# Patient Record
Sex: MALE | Race: WHITE | Employment: OTHER | URBAN - METROPOLITAN AREA
[De-identification: names, ages, dates, MRNs, and addresses within clinical notes are randomized per-mention and may not be internally consistent; named-entity substitution may affect disease eponyms.]

---

## 2017-08-12 PROCEDURE — 84153 ASSAY OF PSA TOTAL: CPT | Performed by: INTERNAL MEDICINE

## 2017-08-31 PROCEDURE — 88305 TISSUE EXAM BY PATHOLOGIST: CPT | Performed by: INTERNAL MEDICINE

## 2018-07-24 PROCEDURE — 84153 ASSAY OF PSA TOTAL: CPT | Performed by: INTERNAL MEDICINE

## 2020-03-11 PROBLEM — M47.812 CERVICAL SPONDYLOSIS: Status: ACTIVE | Noted: 2020-03-11

## 2020-03-11 PROBLEM — M54.12 CERVICAL RADICULOPATHY: Status: ACTIVE | Noted: 2020-03-11

## 2020-03-11 PROBLEM — M48.02 FORAMINAL STENOSIS OF CERVICAL REGION: Status: ACTIVE | Noted: 2020-03-11

## 2020-07-20 ENCOUNTER — ORDER TRANSCRIPTION (OUTPATIENT)
Dept: ADMINISTRATIVE | Facility: HOSPITAL | Age: 70
End: 2020-07-20

## 2020-07-20 DIAGNOSIS — Z01.818 PRE-OPERATIVE CLEARANCE: Primary | ICD-10-CM

## 2020-07-21 ENCOUNTER — LAB ENCOUNTER (OUTPATIENT)
Dept: LAB | Facility: HOSPITAL | Age: 70
End: 2020-07-21
Attending: ORTHOPAEDIC SURGERY
Payer: MEDICARE

## 2020-07-21 DIAGNOSIS — Z01.818 PRE-OPERATIVE CLEARANCE: ICD-10-CM

## 2020-07-22 LAB — SARS-COV-2 RNA RESP QL NAA+PROBE: NOT DETECTED

## 2020-07-23 ENCOUNTER — LAB ENCOUNTER (OUTPATIENT)
Dept: LAB | Age: 70
DRG: 473 | End: 2020-07-23
Attending: ORTHOPAEDIC SURGERY
Payer: MEDICARE

## 2020-07-23 DIAGNOSIS — Z01.818 PREOP TESTING: ICD-10-CM

## 2020-07-23 LAB
ANTIBODY SCREEN: NEGATIVE
RH BLOOD TYPE: POSITIVE

## 2020-07-23 PROCEDURE — 86850 RBC ANTIBODY SCREEN: CPT

## 2020-07-23 PROCEDURE — 86901 BLOOD TYPING SEROLOGIC RH(D): CPT

## 2020-07-23 PROCEDURE — 86900 BLOOD TYPING SEROLOGIC ABO: CPT

## 2020-07-23 PROCEDURE — 36415 COLL VENOUS BLD VENIPUNCTURE: CPT

## 2020-07-23 RX ORDER — VANCOMYCIN HYDROCHLORIDE
15 ONCE
Status: CANCELLED | OUTPATIENT
Start: 2020-07-24

## 2020-07-24 ENCOUNTER — APPOINTMENT (OUTPATIENT)
Dept: GENERAL RADIOLOGY | Facility: HOSPITAL | Age: 70
DRG: 473 | End: 2020-07-24
Attending: ORTHOPAEDIC SURGERY
Payer: MEDICARE

## 2020-07-24 ENCOUNTER — HOSPITAL ENCOUNTER (INPATIENT)
Facility: HOSPITAL | Age: 70
LOS: 1 days | Discharge: HOME | DRG: 473 | End: 2020-07-24
Attending: ORTHOPAEDIC SURGERY | Admitting: ORTHOPAEDIC SURGERY
Payer: MEDICARE

## 2020-07-24 ENCOUNTER — ANESTHESIA (OUTPATIENT)
Dept: SURGERY | Facility: HOSPITAL | Age: 70
DRG: 473 | End: 2020-07-24
Payer: MEDICARE

## 2020-07-24 ENCOUNTER — ANESTHESIA EVENT (OUTPATIENT)
Dept: SURGERY | Facility: HOSPITAL | Age: 70
DRG: 473 | End: 2020-07-24
Payer: MEDICARE

## 2020-07-24 VITALS
HEIGHT: 70 IN | RESPIRATION RATE: 16 BRPM | OXYGEN SATURATION: 94 % | DIASTOLIC BLOOD PRESSURE: 79 MMHG | TEMPERATURE: 98 F | BODY MASS INDEX: 34.79 KG/M2 | SYSTOLIC BLOOD PRESSURE: 155 MMHG | WEIGHT: 243 LBS | HEART RATE: 58 BPM

## 2020-07-24 DIAGNOSIS — M54.12 CERVICAL RADICULOPATHY: ICD-10-CM

## 2020-07-24 DIAGNOSIS — Z01.818 PREOP TESTING: Primary | ICD-10-CM

## 2020-07-24 PROCEDURE — BR141ZZ FLUOROSCOPY OF CERVICAL FACET JOINT(S) USING LOW OSMOLAR CONTRAST: ICD-10-PCS | Performed by: ORTHOPAEDIC SURGERY

## 2020-07-24 PROCEDURE — 95860 NEEDLE EMG 1 EXTREMITY: CPT | Performed by: ORTHOPAEDIC SURGERY

## 2020-07-24 PROCEDURE — 0RT30ZZ RESECTION OF CERVICAL VERTEBRAL DISC, OPEN APPROACH: ICD-10-PCS | Performed by: ORTHOPAEDIC SURGERY

## 2020-07-24 PROCEDURE — 0RG20A0 FUSION OF 2 OR MORE CERVICAL VERTEBRAL JOINTS WITH INTERBODY FUSION DEVICE, ANTERIOR APPROACH, ANTERIOR COLUMN, OPEN APPROACH: ICD-10-PCS | Performed by: ORTHOPAEDIC SURGERY

## 2020-07-24 PROCEDURE — 76000 FLUOROSCOPY <1 HR PHYS/QHP: CPT | Performed by: ORTHOPAEDIC SURGERY

## 2020-07-24 PROCEDURE — 95939 C MOTOR EVOKED UPR&LWR LIMBS: CPT | Performed by: ORTHOPAEDIC SURGERY

## 2020-07-24 PROCEDURE — 95938 SOMATOSENSORY TESTING: CPT | Performed by: ORTHOPAEDIC SURGERY

## 2020-07-24 DEVICE — CAGE SPINAL CASCADIA 7D 13X16: Type: IMPLANTABLE DEVICE | Site: NECK | Status: FUNCTIONAL

## 2020-07-24 DEVICE — I-FACTOR PUTTY, 1.0CC SYRINGE
Type: IMPLANTABLE DEVICE | Site: NECK | Status: FUNCTIONAL
Brand: I-FACTOR PEPTIDE ENHANCED BONE GRAFT

## 2020-07-24 RX ORDER — SODIUM CHLORIDE, SODIUM LACTATE, POTASSIUM CHLORIDE, CALCIUM CHLORIDE 600; 310; 30; 20 MG/100ML; MG/100ML; MG/100ML; MG/100ML
INJECTION, SOLUTION INTRAVENOUS CONTINUOUS
Status: DISCONTINUED | OUTPATIENT
Start: 2020-07-24 | End: 2020-07-24

## 2020-07-24 RX ORDER — OXYCODONE HYDROCHLORIDE 5 MG/1
10 TABLET ORAL ONCE
Status: COMPLETED | OUTPATIENT
Start: 2020-07-24 | End: 2020-07-24

## 2020-07-24 RX ORDER — HYDROCODONE BITARTRATE AND ACETAMINOPHEN 5; 325 MG/1; MG/1
2 TABLET ORAL AS NEEDED
Status: DISCONTINUED | OUTPATIENT
Start: 2020-07-24 | End: 2020-07-24

## 2020-07-24 RX ORDER — DEXAMETHASONE SODIUM PHOSPHATE 4 MG/ML
VIAL (ML) INJECTION AS NEEDED
Status: DISCONTINUED | OUTPATIENT
Start: 2020-07-24 | End: 2020-07-24 | Stop reason: SURG

## 2020-07-24 RX ORDER — MORPHINE SULFATE 4 MG/ML
4 INJECTION, SOLUTION INTRAMUSCULAR; INTRAVENOUS EVERY 10 MIN PRN
Status: DISCONTINUED | OUTPATIENT
Start: 2020-07-24 | End: 2020-07-24

## 2020-07-24 RX ORDER — OXYCODONE HYDROCHLORIDE AND ACETAMINOPHEN 5; 325 MG/1; MG/1
1 TABLET ORAL EVERY 4 HOURS PRN
Status: DISCONTINUED | OUTPATIENT
Start: 2020-07-24 | End: 2020-07-24

## 2020-07-24 RX ORDER — ONDANSETRON 2 MG/ML
INJECTION INTRAMUSCULAR; INTRAVENOUS AS NEEDED
Status: DISCONTINUED | OUTPATIENT
Start: 2020-07-24 | End: 2020-07-24 | Stop reason: SURG

## 2020-07-24 RX ORDER — MIDAZOLAM HYDROCHLORIDE 1 MG/ML
INJECTION INTRAMUSCULAR; INTRAVENOUS AS NEEDED
Status: DISCONTINUED | OUTPATIENT
Start: 2020-07-24 | End: 2020-07-24 | Stop reason: SURG

## 2020-07-24 RX ORDER — ONDANSETRON 2 MG/ML
4 INJECTION INTRAMUSCULAR; INTRAVENOUS ONCE AS NEEDED
Status: DISCONTINUED | OUTPATIENT
Start: 2020-07-24 | End: 2020-07-24

## 2020-07-24 RX ORDER — ACETAMINOPHEN 500 MG
1000 TABLET ORAL ONCE
Status: DISCONTINUED | OUTPATIENT
Start: 2020-07-24 | End: 2020-07-24 | Stop reason: HOSPADM

## 2020-07-24 RX ORDER — OXYCODONE HYDROCHLORIDE AND ACETAMINOPHEN 5; 325 MG/1; MG/1
TABLET ORAL
Qty: 60 TABLET | Refills: 0 | Status: SHIPPED | OUTPATIENT
Start: 2020-07-24 | End: 2020-07-24

## 2020-07-24 RX ORDER — OXYCODONE HYDROCHLORIDE AND ACETAMINOPHEN 5; 325 MG/1; MG/1
TABLET ORAL
Qty: 60 TABLET | Refills: 0 | Status: SHIPPED | OUTPATIENT
Start: 2020-07-24 | End: 2020-08-07

## 2020-07-24 RX ORDER — MORPHINE SULFATE 2 MG/ML
2 INJECTION, SOLUTION INTRAMUSCULAR; INTRAVENOUS EVERY 10 MIN PRN
Status: DISCONTINUED | OUTPATIENT
Start: 2020-07-24 | End: 2020-07-24

## 2020-07-24 RX ORDER — HYDROMORPHONE HYDROCHLORIDE 1 MG/ML
0.6 INJECTION, SOLUTION INTRAMUSCULAR; INTRAVENOUS; SUBCUTANEOUS EVERY 5 MIN PRN
Status: DISCONTINUED | OUTPATIENT
Start: 2020-07-24 | End: 2020-07-24

## 2020-07-24 RX ORDER — PROCHLORPERAZINE EDISYLATE 5 MG/ML
5 INJECTION INTRAMUSCULAR; INTRAVENOUS ONCE AS NEEDED
Status: DISCONTINUED | OUTPATIENT
Start: 2020-07-24 | End: 2020-07-24

## 2020-07-24 RX ORDER — VANCOMYCIN HYDROCHLORIDE 500 MG/10ML
INJECTION, POWDER, LYOPHILIZED, FOR SOLUTION INTRAVENOUS CONTINUOUS PRN
Status: COMPLETED | OUTPATIENT
Start: 2020-07-24 | End: 2020-07-24

## 2020-07-24 RX ORDER — FAMOTIDINE 20 MG/1
20 TABLET ORAL ONCE
Status: COMPLETED | OUTPATIENT
Start: 2020-07-24 | End: 2020-07-24

## 2020-07-24 RX ORDER — VANCOMYCIN HYDROCHLORIDE
15 ONCE
Status: COMPLETED | OUTPATIENT
Start: 2020-07-24 | End: 2020-07-24

## 2020-07-24 RX ORDER — SCOLOPAMINE TRANSDERMAL SYSTEM 1 MG/1
1 PATCH, EXTENDED RELEASE TRANSDERMAL ONCE
Status: DISCONTINUED | OUTPATIENT
Start: 2020-07-24 | End: 2020-07-24

## 2020-07-24 RX ORDER — NALOXONE HYDROCHLORIDE 0.4 MG/ML
80 INJECTION, SOLUTION INTRAMUSCULAR; INTRAVENOUS; SUBCUTANEOUS AS NEEDED
Status: DISCONTINUED | OUTPATIENT
Start: 2020-07-24 | End: 2020-07-24

## 2020-07-24 RX ORDER — HALOPERIDOL 5 MG/ML
0.25 INJECTION INTRAMUSCULAR ONCE AS NEEDED
Status: DISCONTINUED | OUTPATIENT
Start: 2020-07-24 | End: 2020-07-24

## 2020-07-24 RX ORDER — METHYLPREDNISOLONE ACETATE 40 MG/ML
INJECTION, SUSPENSION INTRA-ARTICULAR; INTRALESIONAL; INTRAMUSCULAR; SOFT TISSUE AS NEEDED
Status: DISCONTINUED | OUTPATIENT
Start: 2020-07-24 | End: 2020-07-24

## 2020-07-24 RX ORDER — METOCLOPRAMIDE 10 MG/1
10 TABLET ORAL ONCE
Status: COMPLETED | OUTPATIENT
Start: 2020-07-24 | End: 2020-07-24

## 2020-07-24 RX ORDER — MORPHINE SULFATE 10 MG/ML
6 INJECTION, SOLUTION INTRAMUSCULAR; INTRAVENOUS EVERY 10 MIN PRN
Status: DISCONTINUED | OUTPATIENT
Start: 2020-07-24 | End: 2020-07-24

## 2020-07-24 RX ORDER — HYDROCODONE BITARTRATE AND ACETAMINOPHEN 5; 325 MG/1; MG/1
1 TABLET ORAL AS NEEDED
Status: DISCONTINUED | OUTPATIENT
Start: 2020-07-24 | End: 2020-07-24

## 2020-07-24 RX ORDER — HYDROMORPHONE HYDROCHLORIDE 1 MG/ML
0.2 INJECTION, SOLUTION INTRAMUSCULAR; INTRAVENOUS; SUBCUTANEOUS EVERY 5 MIN PRN
Status: DISCONTINUED | OUTPATIENT
Start: 2020-07-24 | End: 2020-07-24

## 2020-07-24 RX ORDER — HYDROMORPHONE HYDROCHLORIDE 1 MG/ML
0.4 INJECTION, SOLUTION INTRAMUSCULAR; INTRAVENOUS; SUBCUTANEOUS EVERY 5 MIN PRN
Status: DISCONTINUED | OUTPATIENT
Start: 2020-07-24 | End: 2020-07-24

## 2020-07-24 RX ADMIN — SODIUM CHLORIDE, SODIUM LACTATE, POTASSIUM CHLORIDE, CALCIUM CHLORIDE: 600; 310; 30; 20 INJECTION, SOLUTION INTRAVENOUS at 12:07:00

## 2020-07-24 RX ADMIN — DEXAMETHASONE SODIUM PHOSPHATE 8 MG: 4 MG/ML VIAL (ML) INJECTION at 12:13:10

## 2020-07-24 RX ADMIN — DEXAMETHASONE SODIUM PHOSPHATE 8 MG: 4 MG/ML VIAL (ML) INJECTION at 12:13:00

## 2020-07-24 RX ADMIN — ONDANSETRON 4 MG: 2 INJECTION INTRAMUSCULAR; INTRAVENOUS at 14:11:00

## 2020-07-24 RX ADMIN — SODIUM CHLORIDE, SODIUM LACTATE, POTASSIUM CHLORIDE, CALCIUM CHLORIDE: 600; 310; 30; 20 INJECTION, SOLUTION INTRAVENOUS at 14:42:00

## 2020-07-24 RX ADMIN — MIDAZOLAM HYDROCHLORIDE 2 MG: 1 INJECTION INTRAMUSCULAR; INTRAVENOUS at 12:07:00

## 2020-07-24 NOTE — H&P
Patient: Susan Patel Record Number: [id number]  Referring Physician:  Tru Melton  PCP: Clau Hall MD        HISTORY OF CHIEF COMPLAINT:    Janeth Hung is a 77year old male, who comes today to discuss his neck problem.   The patient   1 cm polyp; fair prep- repeat 1 yr (2 day prep)   • COLONOSCOPY, POSSIBLE BIOPSY, POSSIBLE POLYPECTOMY 06646 N/A 8/31/2017     Performed by Salena Hopkins MD at [address] [place] [address] N/A 5/14/2015     Performed by • CALCIUM-MAGNESIUM-ZINC 1000-400-15 MG OR TABS Take  by mouth. Once in a while.        • CRANBERRY CONCENTRATE 100-3 MG-UNIT OR CAPS PRN for GOUT              REVIEW OF SYSTEMS     A comprehensive 10 point review of systems was completed.   Pertinent posit IMAGING STUDIES:  Imaging studies reviewed with the patient today  XR:  MRI: MRI demonstrates significant foraminal stenosis right more than left C5-6 and at C6-7 with large disc herniation C6-7  CT:

## 2020-07-24 NOTE — BRIEF OP NOTE
Pre-Operative Diagnosis: Cervical radiculopathy [M54.12]     Post-Operative Diagnosis: Cervical radiculopathy [M54.12]      Procedure Performed:   Procedure(s):  Cervical 5 - Cervical 6, Cervical 6 - Cervical 7 Anterior Cervical Discectomy Fusion    [place]

## 2020-07-24 NOTE — OPERATIVE REPORT
Operative Note     Patient Name: Dalton Turner  Date: 7/24/2020  Preoperative Diagnosis: Cervical Radiculopathy right side C5-7  Postoperative Diagnosis: Same  Primary Surgeon: Pebbles Perez MD  Assistant: Rajinder MARCIAL  Procedures:   C5-7 Anterior After careful identification patient was taken to the OR, the patient was administered prophylactic antibiotics.   After successful induction of general endotracheal anesthesia, the patient was positioned supine on the operating table with gentle neck exte Mild distraction of the interspace was afforded. A #15 blade was used to create an initial annulotomy. Complete discectomy was complete from uncinate to uncinate with a combination of curettes, pituitary and Kerrison rongeurs.   The overhanging osteophyte All bleeders were meticulously controlled using bipolar electrocautery and floseal. The remainder of the floseal was mixed with 40mg of depomedrol and placed over the plate for minimization of dysphagia. 100mg of vancomycin powder was placed in the wound.

## 2020-07-24 NOTE — PROGRESS NOTES
S:  Patient awake in PACU. He is alert, talking and oriented. He states he has no pain. He denies any numbness or tingling. Denies difficulty breathing or swallowing     Inspection: Awake Alert  No acute distress.      Blood pressure 121/64, pulse 60, tempe

## 2020-07-24 NOTE — ANESTHESIA PREPROCEDURE EVALUATION
Anesthesia PreOp Note    HPI:     Sari Dove is a 77year old male who presents for preoperative consultation requested by: Anai Bill MD    Date of Surgery: 7/24/2020    Procedure(s):  ANTERIOR CERVICAL FUSION BG & INST 2 LEVEL  Indication: Geno Coins day prep)   • COLONOSCOPY, POSSIBLE BIOPSY, POSSIBLE POLYPECTOMY 63752 N/A 8/31/2017    Performed by Emilia Lennon MD at [address] 5/14/2015    Performed by Allyssa Mackay MD at [address] [facility]       me 1128, 1,250 mg at 07/24/20 1128    No current [facility]-ordered outpatient medications on file.         Amoxil                  SWELLING  Pcns [Penicillins]      SWELLING    Family History   Problem Relation Age of Onset   • Cancer Sister         breast CA   • C reviewed.   Lab Results   Component Value Date    WBC 6.57 07/07/2020    RBC 4.90 07/07/2020    HGB 14.6 07/07/2020    HCT 41.6 07/07/2020    MCV 84.9 07/07/2020    MCH 29.8 07/07/2020    MCHC 35.1 07/07/2020    RDW 12.1 07/07/2020     07/07/2020 Sophy Ring  7/24/2020 12:02 PM

## 2020-07-24 NOTE — ANESTHESIA POSTPROCEDURE EVALUATION
Patient: Bridgette Quintana    Procedure Summary     Date:  07/24/20 Room / Location:  [address] [place] [address] MAIN OR 10 / [facility] [place]note 2[place] MAIN OR    Anesthesia Start:  0018 Anesthesia Stop:  2576    Procedure:  ANTERIOR CERVICAL FUSION BG & INST 2 LEVEL (N/A Neck) Diagnosis:       Cervical

## 2020-07-24 NOTE — ANESTHESIA PROCEDURE NOTES
Airway  Urgency: Elective      General Information and Staff    Patient location during procedure: OR  Anesthesiologist: Laura Brady MD  Performed: anesthesiologist     Indications and Patient Condition  Indications for airway management: anesthesia  Se

## 2021-01-19 PROBLEM — M54.12 CERVICAL RADICULOPATHY: Status: RESOLVED | Noted: 2020-03-11 | Resolved: 2021-01-19

## (undated) DEVICE — SOL  .9 1000ML BTL

## (undated) DEVICE — BAG SRG CLR 36X30IN

## (undated) DEVICE — COVER STND 54X23IN MAYO REINF

## (undated) DEVICE — CONTAINER SPEC STR 4OZ GRY LID

## (undated) DEVICE — DRAPE SRG 150X54IN LEICA

## (undated) DEVICE — TIP BOVIE 4\" MEGADYNE

## (undated) DEVICE — Device

## (undated) DEVICE — DRAPE SHEET LAPAROTOMY

## (undated) DEVICE — CERVICAL CDS: Brand: MEDLINE INDUSTRIES, INC.

## (undated) DEVICE — PIN DSTRCT 14MM

## (undated) DEVICE — DRAIN SILICONE RND 1/8

## (undated) DEVICE — FORCEP CUSH BAY BP DISP 7.5IN

## (undated) DEVICE — SUTURE VICRYL 2-0 FS-1

## (undated) DEVICE — 3.0MM PRECISION NEURO (MATCH HEAD)

## (undated) DEVICE — UNDYED BRAIDED (POLYGLACTIN 910), SYNTHETIC ABSORBABLE SUTURE: Brand: COATED VICRYL

## (undated) DEVICE — NVM5 MULTIMODALITY SURFACE KIT

## (undated) DEVICE — SUTURE MONOCRYL 3-0 Y936H

## (undated) DEVICE — DRAIN RESERVOIR RELIAVAC 100CC

## (undated) DEVICE — GAMMEX® PI HYBRID SIZE 7.5, STERILE POWDER-FREE SURGICAL GLOVE, POLYISOPRENE AND NEOPRENE BLEND: Brand: GAMMEX